# Patient Record
Sex: FEMALE | Race: WHITE | NOT HISPANIC OR LATINO | Employment: OTHER | URBAN - METROPOLITAN AREA
[De-identification: names, ages, dates, MRNs, and addresses within clinical notes are randomized per-mention and may not be internally consistent; named-entity substitution may affect disease eponyms.]

---

## 2017-05-18 ENCOUNTER — APPOINTMENT (OUTPATIENT)
Dept: WOMENS IMAGING | Facility: HOSPITAL | Age: 67
End: 2017-05-18

## 2017-05-18 PROCEDURE — G0206 DX MAMMO INCL CAD UNI: HCPCS | Performed by: RADIOLOGY

## 2017-05-18 PROCEDURE — MDREVIEWSP: Performed by: RADIOLOGY

## 2017-05-18 PROCEDURE — 76641 ULTRASOUND BREAST COMPLETE: CPT | Performed by: RADIOLOGY

## 2017-05-18 PROCEDURE — G0279 TOMOSYNTHESIS, MAMMO: HCPCS | Performed by: RADIOLOGY

## 2017-09-25 ENCOUNTER — OFFICE VISIT (OUTPATIENT)
Dept: OBSTETRICS AND GYNECOLOGY | Facility: CLINIC | Age: 67
End: 2017-09-25

## 2017-09-25 VITALS
DIASTOLIC BLOOD PRESSURE: 85 MMHG | HEIGHT: 64 IN | SYSTOLIC BLOOD PRESSURE: 170 MMHG | HEART RATE: 73 BPM | BODY MASS INDEX: 26.98 KG/M2 | WEIGHT: 158 LBS

## 2017-09-25 DIAGNOSIS — Z78.0 MENOPAUSE: Primary | ICD-10-CM

## 2017-09-25 DIAGNOSIS — Z01.419 ENCOUNTER FOR GYNECOLOGICAL EXAMINATION WITHOUT ABNORMAL FINDING: ICD-10-CM

## 2017-09-25 DIAGNOSIS — N95.2 ATROPHIC VAGINITIS: ICD-10-CM

## 2017-09-25 DIAGNOSIS — Z12.11 COLON CANCER SCREENING: ICD-10-CM

## 2017-09-25 LAB — HEMOCCULT STL QL IA: NEGATIVE

## 2017-09-25 PROCEDURE — G0328 FECAL BLOOD SCRN IMMUNOASSAY: HCPCS | Performed by: OBSTETRICS & GYNECOLOGY

## 2017-09-25 PROCEDURE — G0101 CA SCREEN;PELVIC/BREAST EXAM: HCPCS | Performed by: OBSTETRICS & GYNECOLOGY

## 2017-09-25 NOTE — PROGRESS NOTES
Subjective   Rachel Loja is a 66 y.o. female  1, Para 0 AB 1, Living 0.  Last annual 1 year, last pap 1 year, last mammogram sched, last colonoscopy 2y.  Cc: Annual exam  History of Present Illness  She denies any gynecologic problems at this time  The following portions of the patient's history were reviewed and updated as appropriate: allergies, current medications, past family history, past medical history, past social history, past surgical history and problem list.    Review of Systems   Constitutional: Negative for activity change, fatigue, fever and unexpected weight change.   HENT: Negative for hearing loss, sore throat and voice change.    Respiratory: Negative for cough, chest tightness, shortness of breath and wheezing.    Cardiovascular: Negative for chest pain, palpitations and leg swelling.   Gastrointestinal: Negative for abdominal distention, abdominal pain, anal bleeding, blood in stool, constipation, diarrhea, nausea, rectal pain and vomiting.   Endocrine: Negative for cold intolerance and heat intolerance.   Genitourinary: Negative for difficulty urinating, dyspareunia, dysuria, flank pain, frequency, genital sores, menstrual problem, pelvic pain, urgency, vaginal bleeding, vaginal discharge and vaginal pain.   Musculoskeletal: Negative for arthralgias and back pain.   Skin: Negative for rash.   Allergic/Immunologic: Negative for environmental allergies and food allergies.   Neurological: Negative for dizziness, tremors, syncope, weakness, light-headedness, numbness and headaches.   Hematological: Negative for adenopathy. Does not bruise/bleed easily.   Psychiatric/Behavioral: Negative for agitation, behavioral problems, self-injury, sleep disturbance and suicidal ideas. The patient is not nervous/anxious and is not hyperactive.          Past Medical History:   Diagnosis Date   • Environmental allergies    • Meniere's disease      Menstrual History:  OB History      Para Term  " AB Living    1    1     SAB TAB Ectopic Multiple Live Births    1             Menarche age:11  No LMP recorded (lmp unknown). Patient is postmenopausal.       History reviewed. No pertinent surgical history.  OB History      Para Term  AB Living    1    1     SAB TAB Ectopic Multiple Live Births    1            Family History   Problem Relation Age of Onset   • Ovarian cancer Cousin 52   • Prostate cancer Father      History   Smoking Status   • Never Smoker   Smokeless Tobacco   • Never Used     History   Alcohol Use   • 0.6 oz/week   • 1 Glasses of wine per week     Comment: rare     Health Maintenance   Topic Date Due   • TDAP/TD VACCINES (1 - Tdap) 10/30/1969   • PNEUMOCOCCAL VACCINES (65+ LOW/MEDIUM RISK) (1 of 2 - PCV13) 10/30/2015   • HEPATITIS C SCREENING  2016   • MEDICARE ANNUAL WELLNESS  2016   • ZOSTER VACCINE  2016   • INFLUENZA VACCINE  2017   • DXA SCAN  2017   • MAMMOGRAM  2018   • COLONOSCOPY  2025       Current Outpatient Prescriptions:   •  aspirin 81 MG chewable tablet, Chew 81 mg daily., Disp: , Rfl:   •  azelastine (ASTELIN) 0.1 % nasal spray, 2 sprays into each nostril 2 (two) times a day. Use in each nostril as directed, Disp: , Rfl:   •  calcium citrate-vitamin d (CALCIUM CITRATE + D) 315-250 MG-UNIT tablet tablet, Take  by mouth daily., Disp: , Rfl:   •  cetirizine (ZyrTEC) 10 MG tablet, Take 10 mg by mouth daily., Disp: , Rfl:   •  coenzyme Q10 100 MG capsule, Take 100 mg by mouth daily., Disp: , Rfl:   •  fluocinolone acetonide (DERMOTIC) 0.01 % oil otic oil, , Disp: , Rfl:   •  lovastatin (MEVACOR) 20 MG tablet, , Disp: , Rfl:   Sexual History: Not active  STD: Negative  Objective   Vitals:    17 1325   BP: 170/85   Pulse: 73   Weight: 158 lb (71.7 kg)   Height: 64\" (162.6 cm)     Physical Exam   Constitutional: She is oriented to person, place, and time. She appears well-developed and well-nourished.   HENT:   Head: " Normocephalic.   Eyes: Pupils are equal, round, and reactive to light.   Neck: Normal range of motion. No thyromegaly present.   Cardiovascular: Normal rate, regular rhythm, normal heart sounds and intact distal pulses.    Pulmonary/Chest: Effort normal and breath sounds normal. No respiratory distress. She exhibits no tenderness. Right breast exhibits no inverted nipple, no mass, no nipple discharge, no skin change and no tenderness. Left breast exhibits no inverted nipple, no mass, no nipple discharge, no skin change and no tenderness. Breasts are symmetrical.   Abdominal: Soft. Bowel sounds are normal. Hernia confirmed negative in the right inguinal area and confirmed negative in the left inguinal area.   Genitourinary: Rectum normal, vagina normal and uterus normal. Rectal exam shows no external hemorrhoid, no internal hemorrhoid, no fissure, no mass, no tenderness, anal tone normal and guaiac negative stool. No breast tenderness or discharge. Pelvic exam was performed with patient supine. There is no rash, tenderness, lesion or injury on the right labia. There is no rash, tenderness, lesion or injury on the left labia. Uterus is not enlarged and not tender. Cervix exhibits no motion tenderness, no discharge and no friability. Right adnexum displays no mass, no tenderness and no fullness. Left adnexum displays no mass, no tenderness and no fullness.   Genitourinary Comments: Vaginal atrophy   Lymphadenopathy:     She has no cervical adenopathy.        Right: No inguinal adenopathy present.        Left: No inguinal adenopathy present.   Neurological: She is alert and oriented to person, place, and time. She has normal reflexes.   Skin: Skin is warm and dry.   Psychiatric: She has a normal mood and affect. Her behavior is normal. Judgment and thought content normal.   Vitals reviewed.        Assessment/Plan   Rachel was seen today for gynecologic exam.    Diagnoses and all orders for this  visit:    Menopause    Encounter for gynecological examination without abnormal finding    Atrophic vaginitis    Patient was counseled about breast self-examination, mammograms, frequency of Pap smears, frequency of annual exams

## 2017-11-16 ENCOUNTER — APPOINTMENT (OUTPATIENT)
Dept: WOMENS IMAGING | Facility: HOSPITAL | Age: 67
End: 2017-11-16

## 2017-11-16 ENCOUNTER — PROCEDURE VISIT (OUTPATIENT)
Dept: OBSTETRICS AND GYNECOLOGY | Facility: CLINIC | Age: 67
End: 2017-11-16

## 2017-11-16 DIAGNOSIS — Z12.31 VISIT FOR SCREENING MAMMOGRAM: Primary | ICD-10-CM

## 2017-11-16 PROCEDURE — G0202 SCR MAMMO BI INCL CAD: HCPCS | Performed by: OBSTETRICS & GYNECOLOGY

## 2017-11-16 PROCEDURE — G0202 SCR MAMMO BI INCL CAD: HCPCS | Performed by: RADIOLOGY

## 2017-11-28 ENCOUNTER — TELEPHONE (OUTPATIENT)
Dept: OBSTETRICS AND GYNECOLOGY | Facility: CLINIC | Age: 67
End: 2017-11-28

## 2017-11-28 NOTE — TELEPHONE ENCOUNTER
----- Message from Douglas Cali MD sent at 11/28/2017 11:05 AM EST -----  Tell the patient her mammogram was negative  ----- Message -----     From: Interface, Scans Incoming     Sent: 11/28/2017   7:32 AM       To: Douglas Cali MD

## 2018-11-27 ENCOUNTER — APPOINTMENT (OUTPATIENT)
Dept: WOMENS IMAGING | Facility: HOSPITAL | Age: 68
End: 2018-11-27

## 2018-11-27 ENCOUNTER — PROCEDURE VISIT (OUTPATIENT)
Dept: OBSTETRICS AND GYNECOLOGY | Facility: CLINIC | Age: 68
End: 2018-11-27

## 2018-11-27 ENCOUNTER — OFFICE VISIT (OUTPATIENT)
Dept: OBSTETRICS AND GYNECOLOGY | Facility: CLINIC | Age: 68
End: 2018-11-27

## 2018-11-27 VITALS
DIASTOLIC BLOOD PRESSURE: 76 MMHG | BODY MASS INDEX: 26.63 KG/M2 | SYSTOLIC BLOOD PRESSURE: 163 MMHG | HEIGHT: 64 IN | WEIGHT: 156 LBS | HEART RATE: 64 BPM

## 2018-11-27 DIAGNOSIS — Z00.00 ENCOUNTER FOR SCREENING AND PREVENTATIVE CARE: Primary | ICD-10-CM

## 2018-11-27 DIAGNOSIS — Z13.820 SCREENING FOR OSTEOPOROSIS: ICD-10-CM

## 2018-11-27 DIAGNOSIS — Z78.0 MENOPAUSE: ICD-10-CM

## 2018-11-27 DIAGNOSIS — Z01.419 ENCOUNTER FOR GYNECOLOGICAL EXAMINATION WITHOUT ABNORMAL FINDING: Primary | ICD-10-CM

## 2018-11-27 DIAGNOSIS — Z12.31 VISIT FOR SCREENING MAMMOGRAM: Primary | ICD-10-CM

## 2018-11-27 DIAGNOSIS — N95.2 ATROPHIC VAGINITIS: ICD-10-CM

## 2018-11-27 PROCEDURE — 77067 SCR MAMMO BI INCL CAD: CPT | Performed by: OBSTETRICS & GYNECOLOGY

## 2018-11-27 PROCEDURE — 77080 DXA BONE DENSITY AXIAL: CPT | Performed by: OBSTETRICS & GYNECOLOGY

## 2018-11-27 PROCEDURE — 77067 SCR MAMMO BI INCL CAD: CPT | Performed by: RADIOLOGY

## 2018-11-27 PROCEDURE — G0101 CA SCREEN;PELVIC/BREAST EXAM: HCPCS | Performed by: OBSTETRICS & GYNECOLOGY

## 2018-11-27 RX ORDER — NEBIVOLOL HYDROCHLORIDE 10 MG/1
TABLET ORAL
COMMUNITY
Start: 2018-11-21 | End: 2019-12-13

## 2018-11-27 RX ORDER — TRIAMCINOLONE ACETONIDE 1 MG/G
CREAM TOPICAL
COMMUNITY
Start: 2018-10-01 | End: 2019-12-13

## 2018-11-27 RX ORDER — LISINOPRIL 20 MG/1
TABLET ORAL
COMMUNITY
Start: 2018-11-09 | End: 2019-12-13

## 2018-11-27 NOTE — PROGRESS NOTES
Subjective   Rachel Loja is a 68 y.o. female  1, Para 0 AB 1, Living 0.  Last annual 1y annually, last pap 2y, last mammogram today, last colonoscopy sched..  Cc: Annual exam  History of Present Illness  She denies any gynecologic problems at this time.  The following portions of the patient's history were reviewed and updated as appropriate: allergies, current medications, past family history, past medical history, past social history, past surgical history and problem list.    Review of Systems   Constitutional: Negative for activity change, fatigue, fever and unexpected weight change.   HENT: Negative for hearing loss, sore throat and voice change.    Respiratory: Negative for cough, chest tightness, shortness of breath and wheezing.    Cardiovascular: Negative for chest pain, palpitations and leg swelling.   Gastrointestinal: Negative for abdominal distention, abdominal pain, anal bleeding, blood in stool, constipation, diarrhea, nausea, rectal pain and vomiting.   Endocrine: Negative for cold intolerance and heat intolerance.   Genitourinary: Negative for difficulty urinating, dyspareunia, dysuria, flank pain, frequency, genital sores, menstrual problem, pelvic pain, urgency, vaginal bleeding, vaginal discharge and vaginal pain.   Musculoskeletal: Negative for arthralgias and back pain.   Skin: Negative for rash.   Allergic/Immunologic: Negative for environmental allergies and food allergies.   Neurological: Negative for dizziness, tremors, syncope, weakness, light-headedness, numbness and headaches.   Hematological: Negative for adenopathy. Does not bruise/bleed easily.   Psychiatric/Behavioral: Negative for agitation, behavioral problems, self-injury, sleep disturbance and suicidal ideas. The patient is not nervous/anxious and is not hyperactive.          Past Medical History:   Diagnosis Date   • Environmental allergies    • Hypertension    • Meniere's disease      Menstrual History:  OB History       Para Term  AB Living    1       1      SAB TAB Ectopic Molar Multiple Live Births    1                   Menarche age: 10  No LMP recorded (lmp unknown). Patient is postmenopausal.       History reviewed. No pertinent surgical history.  OB History      Para Term  AB Living    1       1      SAB TAB Ectopic Molar Multiple Live Births    1                  Family History   Problem Relation Age of Onset   • Ovarian cancer Cousin 52   • Prostate cancer Father 74   • Breast cancer Paternal Aunt 76     Social History     Tobacco Use   Smoking Status Never Smoker   Smokeless Tobacco Never Used     Social History     Substance and Sexual Activity   Alcohol Use Yes   • Alcohol/week: 0.6 oz   • Types: 1 Glasses of wine per week   • Frequency: Monthly or less    Comment: rare     Health Maintenance   Topic Date Due   • TDAP/TD VACCINES (1 - Tdap) 10/30/1969   • ZOSTER VACCINE (1 of 2) 10/30/2000   • PNEUMOCOCCAL VACCINES (65+ LOW/MEDIUM RISK) (1 of 2 - PCV13) 10/30/2015   • HEPATITIS C SCREENING  2016   • MEDICARE ANNUAL WELLNESS  2016   • DXA SCAN  2017   • INFLUENZA VACCINE  2018   • MAMMOGRAM  2019   • COLONOSCOPY  2025       Current Outpatient Medications:   •  aspirin 81 MG chewable tablet, Chew 81 mg daily., Disp: , Rfl:   •  azelastine (ASTELIN) 0.1 % nasal spray, 2 sprays into each nostril 2 (two) times a day. Use in each nostril as directed, Disp: , Rfl:   •  BYSTOLIC 10 MG tablet, , Disp: , Rfl:   •  cetirizine (ZyrTEC) 10 MG tablet, Take 10 mg by mouth daily., Disp: , Rfl:   •  fluocinolone acetonide (DERMOTIC) 0.01 % oil otic oil, , Disp: , Rfl:   •  lisinopril (PRINIVIL,ZESTRIL) 20 MG tablet, , Disp: , Rfl:   •  lovastatin (MEVACOR) 20 MG tablet, , Disp: , Rfl:   •  triamcinolone (KENALOG) 0.1 % cream, , Disp: , Rfl:   Sexual History: Not active  STD: Negative     Objective   Vitals:    18 1405   BP: 163/76   Pulse: 64   Weight: 70.8 kg (156  "lb)   Height: 162.6 cm (64\")     Physical Exam   Constitutional: She is oriented to person, place, and time. She appears well-developed and well-nourished.   HENT:   Head: Normocephalic.   Eyes: Pupils are equal, round, and reactive to light.   Neck: Normal range of motion. No thyromegaly present.   Cardiovascular: Normal rate, regular rhythm and intact distal pulses.   Pulmonary/Chest: Effort normal and breath sounds normal. No respiratory distress. She exhibits no tenderness. Right breast exhibits no inverted nipple, no mass, no nipple discharge, no skin change and no tenderness. Left breast exhibits no inverted nipple, no mass, no nipple discharge, no skin change and no tenderness. Breasts are symmetrical.   Abdominal: Soft. Bowel sounds are normal. Hernia confirmed negative in the right inguinal area and confirmed negative in the left inguinal area.   Genitourinary: Vagina normal and uterus normal. No breast tenderness or discharge. Pelvic exam was performed with patient supine. There is no rash, tenderness, lesion or injury on the right labia. There is no rash, tenderness, lesion or injury on the left labia. Uterus is not enlarged and not tender. Cervix exhibits no motion tenderness, no discharge and no friability. Right adnexum displays no mass, no tenderness and no fullness. Left adnexum displays no mass, no tenderness and no fullness.   Genitourinary Comments: Rectal exam deferred due to upcoming colonoscopy.  Moderate atrophic vaginitis   Lymphadenopathy:     She has no cervical adenopathy.        Right: No inguinal adenopathy present.        Left: No inguinal adenopathy present.   Neurological: She is alert and oriented to person, place, and time. She has normal reflexes.   Skin: Skin is warm and dry.   Psychiatric: She has a normal mood and affect. Her behavior is normal. Judgment and thought content normal.         Assessment/Plan   Rachel was seen today for annual exam.    Diagnoses and all orders for " this visit:    Encounter for gynecological examination without abnormal finding    Menopause    Atrophic vaginitis    Screening for osteoporosis    Other orders  -     HM DEXA SCAN    She was counseled about breast self-examination, mammograms, colonoscopy, osteoporotic changes

## 2018-12-03 DIAGNOSIS — R92.8 ABNORMAL MAMMOGRAM OF RIGHT BREAST: Primary | ICD-10-CM

## 2018-12-04 ENCOUNTER — TELEPHONE (OUTPATIENT)
Dept: OBSTETRICS AND GYNECOLOGY | Facility: CLINIC | Age: 68
End: 2018-12-04

## 2018-12-04 NOTE — TELEPHONE ENCOUNTER
Dona pt aware of need for additional imaging on right breast. Please schedule at [facility]. Order in chart per Dr.Dorf. Durand

## 2018-12-11 ENCOUNTER — APPOINTMENT (OUTPATIENT)
Dept: WOMENS IMAGING | Facility: HOSPITAL | Age: 68
End: 2018-12-11

## 2018-12-11 PROCEDURE — 76641 ULTRASOUND BREAST COMPLETE: CPT | Performed by: RADIOLOGY

## 2018-12-11 PROCEDURE — G0279 TOMOSYNTHESIS, MAMMO: HCPCS | Performed by: RADIOLOGY

## 2018-12-11 PROCEDURE — 77065 DX MAMMO INCL CAD UNI: CPT | Performed by: RADIOLOGY

## 2018-12-11 PROCEDURE — MDREVIEWSP: Performed by: RADIOLOGY

## 2018-12-12 VITALS
DIASTOLIC BLOOD PRESSURE: 65 MMHG | DIASTOLIC BLOOD PRESSURE: 67 MMHG | SYSTOLIC BLOOD PRESSURE: 153 MMHG | SYSTOLIC BLOOD PRESSURE: 124 MMHG | SYSTOLIC BLOOD PRESSURE: 113 MMHG | HEART RATE: 49 BPM | RESPIRATION RATE: 15 BRPM | RESPIRATION RATE: 20 BRPM | SYSTOLIC BLOOD PRESSURE: 143 MMHG | OXYGEN SATURATION: 99 % | OXYGEN SATURATION: 95 % | HEART RATE: 55 BPM | HEART RATE: 56 BPM | RESPIRATION RATE: 17 BRPM | DIASTOLIC BLOOD PRESSURE: 73 MMHG | DIASTOLIC BLOOD PRESSURE: 68 MMHG | RESPIRATION RATE: 14 BRPM | RESPIRATION RATE: 16 BRPM | HEART RATE: 63 BPM | HEART RATE: 58 BPM | OXYGEN SATURATION: 100 % | SYSTOLIC BLOOD PRESSURE: 134 MMHG | OXYGEN SATURATION: 97 % | DIASTOLIC BLOOD PRESSURE: 76 MMHG | HEART RATE: 57 BPM | RESPIRATION RATE: 21 BRPM | WEIGHT: 149 LBS | SYSTOLIC BLOOD PRESSURE: 176 MMHG | HEIGHT: 64 IN | DIASTOLIC BLOOD PRESSURE: 70 MMHG | TEMPERATURE: 97.6 F | RESPIRATION RATE: 19 BRPM | SYSTOLIC BLOOD PRESSURE: 137 MMHG | OXYGEN SATURATION: 98 % | SYSTOLIC BLOOD PRESSURE: 140 MMHG | DIASTOLIC BLOOD PRESSURE: 58 MMHG | HEART RATE: 54 BPM

## 2018-12-13 ENCOUNTER — AMBULATORY SURGICAL CENTER (AMBULATORY)
Dept: URBAN - METROPOLITAN AREA SURGERY 17 | Facility: SURGERY | Age: 68
End: 2018-12-13

## 2018-12-13 DIAGNOSIS — K57.30 DIVERTICULOSIS OF LARGE INTESTINE WITHOUT PERFORATION OR ABS: ICD-10-CM

## 2018-12-13 DIAGNOSIS — Z86.010 PERSONAL HISTORY OF COLONIC POLYPS: ICD-10-CM

## 2018-12-13 DIAGNOSIS — K64.8 OTHER HEMORRHOIDS: ICD-10-CM

## 2018-12-13 PROCEDURE — G0105 COLORECTAL SCRN; HI RISK IND: HCPCS

## 2019-12-13 ENCOUNTER — PROCEDURE VISIT (OUTPATIENT)
Dept: OBSTETRICS AND GYNECOLOGY | Facility: CLINIC | Age: 69
End: 2019-12-13

## 2019-12-13 ENCOUNTER — OFFICE VISIT (OUTPATIENT)
Dept: OBSTETRICS AND GYNECOLOGY | Facility: CLINIC | Age: 69
End: 2019-12-13

## 2019-12-13 ENCOUNTER — APPOINTMENT (OUTPATIENT)
Dept: WOMENS IMAGING | Facility: HOSPITAL | Age: 69
End: 2019-12-13

## 2019-12-13 VITALS
HEART RATE: 51 BPM | SYSTOLIC BLOOD PRESSURE: 139 MMHG | DIASTOLIC BLOOD PRESSURE: 71 MMHG | BODY MASS INDEX: 26.12 KG/M2 | WEIGHT: 153 LBS | HEIGHT: 64 IN

## 2019-12-13 DIAGNOSIS — Z01.419 ENCOUNTER FOR GYNECOLOGICAL EXAMINATION WITHOUT ABNORMAL FINDING: Primary | ICD-10-CM

## 2019-12-13 DIAGNOSIS — Z12.31 VISIT FOR SCREENING MAMMOGRAM: Primary | ICD-10-CM

## 2019-12-13 PROCEDURE — 77067 SCR MAMMO BI INCL CAD: CPT | Performed by: RADIOLOGY

## 2019-12-13 PROCEDURE — G0101 CA SCREEN;PELVIC/BREAST EXAM: HCPCS | Performed by: OBSTETRICS & GYNECOLOGY

## 2019-12-13 PROCEDURE — 77067 SCR MAMMO BI INCL CAD: CPT | Performed by: OBSTETRICS & GYNECOLOGY

## 2019-12-13 PROCEDURE — 77063 BREAST TOMOSYNTHESIS BI: CPT | Performed by: OBSTETRICS & GYNECOLOGY

## 2019-12-13 PROCEDURE — 77063 BREAST TOMOSYNTHESIS BI: CPT | Performed by: RADIOLOGY

## 2019-12-13 RX ORDER — LISINOPRIL 20 MG/1
TABLET ORAL
COMMUNITY
End: 2021-12-15

## 2019-12-13 RX ORDER — FLUTICASONE PROPIONATE 50 MCG
SPRAY, SUSPENSION (ML) NASAL
COMMUNITY
Start: 2019-12-12 | End: 2021-12-15

## 2019-12-13 RX ORDER — ATORVASTATIN CALCIUM 40 MG/1
TABLET, FILM COATED ORAL
COMMUNITY
End: 2021-12-15

## 2019-12-13 RX ORDER — NEBIVOLOL 10 MG/1
TABLET ORAL
COMMUNITY
End: 2021-12-15

## 2019-12-13 RX ORDER — CETIRIZINE HYDROCHLORIDE 10 MG/1
TABLET ORAL
COMMUNITY
End: 2021-12-15

## 2019-12-13 NOTE — PROGRESS NOTES
GYN Annual Exam     CC- Here for annual exam.     Rachel Loja is a 69 y.o. female who presents for annual well woman exam. Periods are absent due to Menopause.      OB History        1    Para        Term                AB   1    Living           SAB   1    TAB        Ectopic        Molar        Multiple        Live Births                    Current contraception: post menopausal status  History of abnormal Pap smear: yes - no treatment required  Family history of uterine, colon or ovarian cancer: yes - cousin at 52 years with ovarian cancer   History of abnormal mammogram: no  Family history of breast cancer: yes - maternal aunt   Last Pap : 2016 NL HPV neg  Last mammogram: today  Last colonoscopy: 2018  Last DEXA: 2018 NL   Paternal Hip Fracture: none    Past Medical History:   Diagnosis Date   • Environmental allergies    • Hyperlipidemia    • Hypertension    • Meniere's disease        Past Surgical History:   Procedure Laterality Date   • DILATATION AND CURETTAGE           Current Outpatient Medications:   •  aspirin 81 MG chewable tablet, Chew 81 mg daily., Disp: , Rfl:   •  atorvastatin (LIPITOR) 40 MG tablet, atorvastatin 40 mg tablet  Take 1 tablet every day by oral route at bedtime., Disp: , Rfl:   •  azelastine (ASTELIN) 0.1 % nasal spray, 2 sprays into each nostril 2 (two) times a day. Use in each nostril as directed, Disp: , Rfl:   •  cetirizine (ZYRTEC ALLERGY) 10 MG tablet, Zyrtec 10 mg tablet  Take 1 tablet every day by oral route., Disp: , Rfl:   •  fluocinolone acetonide (DERMOTIC) 0.01 % oil otic oil, , Disp: , Rfl:   •  fluticasone (FLONASE) 50 MCG/ACT nasal spray, , Disp: , Rfl:   •  lisinopril (PRINIVIL,ZESTRIL) 20 MG tablet, lisinopril 20 mg tablet  TAKE 1 TABLET BY MOUTH  EVERY DAY, Disp: , Rfl:   •  nebivolol (BYSTOLIC) 10 MG tablet, Bystolic 10 mg tablet  TAKE 1 TABLET BY MOUTH  EVERY DAY, Disp: , Rfl:     No Known Allergies    Social History     Tobacco Use  "  • Smoking status: Never Smoker   • Smokeless tobacco: Never Used   Substance Use Topics   • Alcohol use: Yes     Alcohol/week: 1.0 standard drinks     Types: 1 Glasses of wine per week     Frequency: Monthly or less     Comment: rare   • Drug use: No       Family History   Problem Relation Age of Onset   • Ovarian cancer Cousin 52   • Prostate cancer Father 74   • Breast cancer Paternal Aunt 76   • Uterine cancer Neg Hx    • Colon cancer Neg Hx    • Pulmonary embolism Neg Hx    • Deep vein thrombosis Neg Hx        Review of Systems   Constitutional: Negative for chills and fever.   Gastrointestinal: Negative for abdominal pain.   Genitourinary: Negative for dysuria, pelvic pain, vaginal bleeding and vaginal discharge.   All other systems reviewed and are negative.      /71   Pulse 51   Ht 162.6 cm (64\")   Wt 69.4 kg (153 lb)   LMP  (LMP Unknown)   Breastfeeding No   BMI 26.26 kg/m²     Physical Exam   Constitutional: She is oriented to person, place, and time. She appears well-developed and well-nourished. No distress. She is not obese.  HENT:   Head: Normocephalic and atraumatic.   Eyes: Conjunctivae are normal. Right eye exhibits no discharge. Left eye exhibits no discharge.   Neck: Normal range of motion. Neck supple. No thyromegaly present.   Cardiovascular: Normal rate, regular rhythm and normal heart sounds.   No murmur heard.  Pulmonary/Chest: Effort normal and breath sounds normal. No respiratory distress. Right breast exhibits no inverted nipple, no mass and no nipple discharge. Left breast exhibits no inverted nipple, no mass and no nipple discharge.   Abdominal: Soft. Bowel sounds are normal. She exhibits no distension. There is no tenderness.   Genitourinary: Rectum normal and cervix normal. Rectal exam shows no mass and anal tone normal. Pelvic exam was performed with patient supine. There is no lesion or Bartholin's cyst on the right labia. There is no lesion or Bartholin's cyst on the " left labia. Uterus is anteverted. Uterus is not deviated, enlarged, fixed or exhibiting a mass. Cervix does not exhibit motion tenderness or friability. Right adnexum is non-palpable.Left adnexum is non-palpable.Vagina exhibits loss of rugae. No bleeding in the vagina. No vaginal discharge found.   Musculoskeletal: Normal range of motion. She exhibits no edema.   Lymphadenopathy:     She has no cervical adenopathy.        Right: No inguinal adenopathy present.        Left: No inguinal adenopathy present.   Neurological: She is alert and oriented to person, place, and time.   Skin: Skin is warm and dry. No rash noted.   Psychiatric: She has a normal mood and affect. Her behavior is normal. Judgment and thought content normal.          Assessment     1) GYN annual well woman exam.        Plan     1) Breast Health - Clinical breast exam & mammogram and ACS, Self breast awareness monthly - ACS suggests mammogram every other year especially after age 70  2) Pap - updated today   3) Smoking status- non-smoker   4) Colon health - screening colonoscopy up to date  5) Bone health - Weight bearing exercise, dietary calcium recommendations and vitamin D reviewed.   FRAX and recent Dexa reviewed - 9.6%, 1.2%   6) Activity recommends - Adult 150-300 min/week of multi-component physical activities that include balance training, aerobic and physical strengthening.  Disabled or ill adults should still try to fulfill these requirements, with modifications based on their conditions.   7) Follow up prn and one year (Aware medicare may only cover every other year)       Bala Wallace MD   12/13/2019  12:01 PM

## 2019-12-17 LAB
CONV .: NORMAL
CYTOLOGIST CVX/VAG CYTO: NORMAL
CYTOLOGY CVX/VAG DOC CYTO: NORMAL
CYTOLOGY CVX/VAG DOC THIN PREP: NORMAL
DX ICD CODE: NORMAL
HIV 1 & 2 AB SER-IMP: NORMAL
OTHER STN SPEC: NORMAL
STAT OF ADQ CVX/VAG CYTO-IMP: NORMAL

## 2020-07-24 VITALS — WEIGHT: 136 LBS | HEIGHT: 64 IN

## 2020-07-27 ENCOUNTER — OFFICE (AMBULATORY)
Dept: URBAN - METROPOLITAN AREA TELEHEALTH 6 | Facility: TELEHEALTH | Age: 70
End: 2020-07-27

## 2020-07-27 DIAGNOSIS — Z86.010 PERSONAL HISTORY OF COLONIC POLYPS: ICD-10-CM

## 2020-07-27 DIAGNOSIS — K64.8 OTHER HEMORRHOIDS: ICD-10-CM

## 2020-07-27 DIAGNOSIS — K57.30 DIVERTICULOSIS OF LARGE INTESTINE WITHOUT PERFORATION OR ABS: ICD-10-CM

## 2020-07-27 PROCEDURE — G2012 BRIEF CHECK IN BY MD/QHP: HCPCS | Mod: 95 | Performed by: NURSE PRACTITIONER

## 2020-07-27 NOTE — SERVICEHPINOTES
I had the pleasure of seeing Ms. Vallejo via telemedicine. As you know, she is a very pleasant 69-year-old female patient of Dr. Chapa (reassigned to Dr. Lebron) with a history of colon polyps. She was last seen in our office 12/13/2018 and a bypass capacity when she underwent surveillance colonoscopy, which was normal with exception of diverticulosis. Here today for hemorrhoidal issues.She reports that she was having issues with her internal hemorrhoids with extreme sensitivty with BM. She was subsequently diagnosed with mucous membrane pemphigoid and was treated with oral steroids (followed by dematology) and reports that her rectal issues have since improved/resolved. She reports 1 formed BM per day. She denies blood in her stool, melena, diarrhea, constipation, rectal pain/itching/parenchyma. Rectal protrusions, fecal incontinence or incontinence.DATA REVIEWED:BRCN 7/23/2012 bx TA x1BRCN 10/15/2015 as below bx TA x1, mucosal polyp ×1, and juvenile polyp ×1BRCN 12/13/2018 as below no polyps no biopsies obtained

## 2020-12-14 ENCOUNTER — PROCEDURE VISIT (OUTPATIENT)
Dept: OBSTETRICS AND GYNECOLOGY | Facility: CLINIC | Age: 70
End: 2020-12-14

## 2020-12-14 ENCOUNTER — APPOINTMENT (OUTPATIENT)
Dept: WOMENS IMAGING | Facility: HOSPITAL | Age: 70
End: 2020-12-14

## 2020-12-14 DIAGNOSIS — Z12.31 VISIT FOR SCREENING MAMMOGRAM: Primary | ICD-10-CM

## 2020-12-14 PROCEDURE — 77063 BREAST TOMOSYNTHESIS BI: CPT | Performed by: OBSTETRICS & GYNECOLOGY

## 2020-12-14 PROCEDURE — 77063 BREAST TOMOSYNTHESIS BI: CPT | Performed by: RADIOLOGY

## 2020-12-14 PROCEDURE — 77067 SCR MAMMO BI INCL CAD: CPT | Performed by: OBSTETRICS & GYNECOLOGY

## 2020-12-14 PROCEDURE — 77067 SCR MAMMO BI INCL CAD: CPT | Performed by: RADIOLOGY

## 2021-03-02 DIAGNOSIS — Z23 IMMUNIZATION DUE: ICD-10-CM

## 2021-12-08 ENCOUNTER — TELEPHONE (OUTPATIENT)
Dept: OBSTETRICS AND GYNECOLOGY | Facility: CLINIC | Age: 71
End: 2021-12-08

## 2021-12-08 DIAGNOSIS — M85.80 OSTEOPENIA AFTER MENOPAUSE: Primary | ICD-10-CM

## 2021-12-08 DIAGNOSIS — Z78.0 OSTEOPENIA AFTER MENOPAUSE: Primary | ICD-10-CM

## 2021-12-08 NOTE — TELEPHONE ENCOUNTER
Good morning,     Patient would like to schedule a DEXA scan at [facility] (preferably next Wednesday 12/15/21 after her mammogram)    Thank you!

## 2021-12-13 ENCOUNTER — TELEPHONE (OUTPATIENT)
Dept: OBSTETRICS AND GYNECOLOGY | Facility: CLINIC | Age: 71
End: 2021-12-13

## 2021-12-13 NOTE — TELEPHONE ENCOUNTER
Called patient to try and reschedule bone density scan, possible to [facility] on [address]. To get her in sooner.

## 2021-12-15 ENCOUNTER — OFFICE VISIT (OUTPATIENT)
Dept: OBSTETRICS AND GYNECOLOGY | Facility: CLINIC | Age: 71
End: 2021-12-15

## 2021-12-15 ENCOUNTER — PROCEDURE VISIT (OUTPATIENT)
Dept: OBSTETRICS AND GYNECOLOGY | Facility: CLINIC | Age: 71
End: 2021-12-15

## 2021-12-15 ENCOUNTER — APPOINTMENT (OUTPATIENT)
Dept: WOMENS IMAGING | Facility: HOSPITAL | Age: 71
End: 2021-12-15

## 2021-12-15 VITALS
DIASTOLIC BLOOD PRESSURE: 63 MMHG | HEIGHT: 64 IN | HEART RATE: 47 BPM | SYSTOLIC BLOOD PRESSURE: 131 MMHG | WEIGHT: 156 LBS | BODY MASS INDEX: 26.63 KG/M2

## 2021-12-15 DIAGNOSIS — N39.41 URGE INCONTINENCE: ICD-10-CM

## 2021-12-15 DIAGNOSIS — Z12.31 VISIT FOR SCREENING MAMMOGRAM: Primary | ICD-10-CM

## 2021-12-15 DIAGNOSIS — Z01.419 ENCOUNTER FOR GYNECOLOGICAL EXAMINATION WITHOUT ABNORMAL FINDING: Primary | ICD-10-CM

## 2021-12-15 PROCEDURE — 77067 SCR MAMMO BI INCL CAD: CPT | Performed by: OBSTETRICS & GYNECOLOGY

## 2021-12-15 PROCEDURE — 77063 BREAST TOMOSYNTHESIS BI: CPT | Performed by: RADIOLOGY

## 2021-12-15 PROCEDURE — 3015F CERV CANCER SCREEN DOCD: CPT | Performed by: OBSTETRICS & GYNECOLOGY

## 2021-12-15 PROCEDURE — G0101 CA SCREEN;PELVIC/BREAST EXAM: HCPCS | Performed by: OBSTETRICS & GYNECOLOGY

## 2021-12-15 PROCEDURE — 77067 SCR MAMMO BI INCL CAD: CPT | Performed by: RADIOLOGY

## 2021-12-15 PROCEDURE — 77063 BREAST TOMOSYNTHESIS BI: CPT | Performed by: OBSTETRICS & GYNECOLOGY

## 2021-12-15 RX ORDER — AZELASTINE HCL 205.5 UG/1
SPRAY NASAL
COMMUNITY

## 2021-12-15 RX ORDER — ATORVASTATIN CALCIUM 80 MG/1
TABLET, FILM COATED ORAL
COMMUNITY
Start: 2021-12-03

## 2021-12-15 RX ORDER — NEBIVOLOL 10 MG/1
TABLET ORAL
COMMUNITY

## 2021-12-15 RX ORDER — CETIRIZINE HYDROCHLORIDE 10 MG/1
10 TABLET ORAL DAILY
COMMUNITY

## 2021-12-15 RX ORDER — FLUTICASONE PROPIONATE 50 MCG
1 SPRAY, SUSPENSION (ML) NASAL DAILY
COMMUNITY

## 2021-12-15 RX ORDER — LISINOPRIL 20 MG/1
20 TABLET ORAL DAILY
COMMUNITY

## 2021-12-15 RX ORDER — IBUPROFEN 200 MG
1 CAPSULE ORAL DAILY
COMMUNITY

## 2021-12-15 RX ORDER — CLOPIDOGREL BISULFATE 75 MG/1
TABLET ORAL
COMMUNITY
Start: 2021-09-22

## 2021-12-15 RX ORDER — DIPHENHYDRAMINE HCL 25 MG
25 CAPSULE ORAL
COMMUNITY

## 2021-12-15 NOTE — PROGRESS NOTES
GYN Annual Exam     CC- Here for annual exam.     Rachel Loja is a 71 y.o. female who presents for annual well woman exam. Periods are absent due to Menopause.    OB History        1    Para        Term                AB   1    Living           SAB   1    IAB        Ectopic        Molar        Multiple        Live Births                    Current contraception: post menopausal status  History of abnormal Pap smear: no  Family history of uterine, colon or ovarian cancer: yes - ovarian - cancer   History of abnormal mammogram: no  Family history of breast cancer: yes - aunt   Last Pap : 2019  Last mammogram: today  Last colonoscopy:   Last DEXA: 2018 Nl  Parental Hip Fracture: No    Past Medical History:   Diagnosis Date   • CVA (cerebral vascular accident) (HCC)    • Environmental allergies    • Hyperlipidemia    • Hypertension    • Meniere's disease        Past Surgical History:   Procedure Laterality Date   • DILATATION AND CURETTAGE           Current Outpatient Medications:   •  aspirin 81 MG chewable tablet, Chew 81 mg daily., Disp: , Rfl:   •  atorvastatin (LIPITOR) 80 MG tablet, , Disp: , Rfl:   •  azelastine (ASTEPRO) 0.15 % solution nasal spray, azelastine 205.5 mcg (0.15 %) nasal spray  prn, Disp: , Rfl:   •  calcium citrate (CALCITRATE) 950 (200 Ca) MG tablet, Take 1 tablet by mouth Daily., Disp: , Rfl:   •  cetirizine (zyrTEC) 10 MG tablet, Take 10 mg by mouth Daily., Disp: , Rfl:   •  clopidogrel (PLAVIX) 75 MG tablet, , Disp: , Rfl:   •  diphenhydrAMINE (BENADRYL) 25 mg capsule, Take 25 mg by mouth., Disp: , Rfl:   •  fluticasone (FLONASE) 50 MCG/ACT nasal spray, 1 spray into the nostril(s) as directed by provider Daily., Disp: , Rfl:   •  lisinopril (PRINIVIL,ZESTRIL) 20 MG tablet, Take 20 mg by mouth Daily., Disp: , Rfl:   •  nebivolol (Bystolic) 10 MG tablet, Bystolic 10 mg tablet  TAKE 1 TABLET BY MOUTH  DAILY, Disp: , Rfl:     Allergies   Allergen Reactions   •  "Beeswax Other (See Comments)   • Lanolin Other (See Comments)     Found on allergy test   • Tea Tree Oil Other (See Comments)     Found on allergy test   • Thimerosal Other (See Comments)     Found on allergy test   • Bacitracin Other (See Comments) and Rash     Found on allergy test     • Neomycin Hives   • Neosporin + Pain Relief Max St [Qpluc-Hojrj-Bdyxorq-Pramoxine] Rash   • Polymyxin B Rash       Social History     Tobacco Use   • Smoking status: Never Smoker   • Smokeless tobacco: Never Used   Substance Use Topics   • Alcohol use: Yes     Alcohol/week: 1.0 standard drink     Types: 1 Glasses of wine per week     Comment: rare   • Drug use: No       Family History   Problem Relation Age of Onset   • Ovarian cancer Cousin 52   • Prostate cancer Father 74   • Breast cancer Paternal Aunt 76   • Uterine cancer Neg Hx    • Colon cancer Neg Hx    • Pulmonary embolism Neg Hx    • Deep vein thrombosis Neg Hx        Review of Systems   Constitutional: Negative for chills, fever and unexpected weight loss.   Gastrointestinal: Negative for abdominal pain.   Genitourinary: Negative for dysuria, pelvic pain, vaginal bleeding and vaginal discharge.   All other systems reviewed and are negative.      /63   Pulse (!) 47   Ht 162.6 cm (64\")   Wt 70.8 kg (156 lb)   LMP  (LMP Unknown)   Breastfeeding No   BMI 26.78 kg/m²     Physical Exam  Constitutional:       General: She is not in acute distress.     Appearance: She is well-developed and normal weight.   Genitourinary:      Vulva, bladder, rectum and urethral meatus normal.      Right Labia: No lesions or Bartholin's cyst.     Left Labia: No lesions or Bartholin's cyst.     No inguinal adenopathy present in the right or left side.     No vaginal discharge or bleeding.      No vaginal prolapse present.     Mild vaginal atrophy present.       Right Adnexa: not tender, not full and no mass present.     Left Adnexa: not tender, not full and no mass present.     No " cervical motion tenderness or friability.      No parametrium thickening present.     Uterus is not enlarged or tender.      No uterine mass detected.  Rectum:      No rectal mass or abnormal anal tone.   Breasts:      Right: No inverted nipple, mass or nipple discharge.      Left: No inverted nipple, mass or nipple discharge.       HENT:      Head: Normocephalic and atraumatic.   Eyes:      Conjunctiva/sclera: Conjunctivae normal.      Pupils: Pupils are equal, round, and reactive to light.   Neck:      Thyroid: No thyromegaly.   Cardiovascular:      Rate and Rhythm: Normal rate and regular rhythm.      Heart sounds: Normal heart sounds. No murmur heard.      Pulmonary:      Effort: Pulmonary effort is normal. No respiratory distress.      Breath sounds: Normal breath sounds.   Abdominal:      General: Abdomen is flat. There is no distension.      Palpations: Abdomen is soft.      Tenderness: There is no abdominal tenderness.   Musculoskeletal:         General: No deformity. Normal range of motion.      Cervical back: Normal range of motion and neck supple.   Lymphadenopathy:      Lower Body: No right inguinal adenopathy. No left inguinal adenopathy.   Neurological:      Mental Status: She is alert and oriented to person, place, and time.   Skin:     General: Skin is warm and dry.      Findings: No erythema.   Psychiatric:         Behavior: Behavior normal.   Vitals reviewed. Exam conducted with a chaperone present.             Assessment     Diagnoses and all orders for this visit:    1. Encounter for gynecological examination without abnormal finding (Primary)  -     IGP, Rfx Aptima HPV ASCU    2. Urge incontinence    1) GYN exam   Expectations reviewed   2) Urgency   Discussed treatment indication/options      Plan     1) Breast Health - Clinical breast exam & mammogram reviewed specifically [employer] recommendations for screening specific to her, and Self breast awareness monthly  2) Pap - updated  today   3) Smoking status- non-smoker   4) Colon health - screening colonoscopy up to date  5) Bone health - Weight bearing exercise, dietary calcium recommendations and vitamin D reviewed.   Scheduled for DEXA upcoming   6) Encouraged to be wary of information obtained via social media and internet based on source and search.   7) Follow up prn and one year      Bala Wallace MD   12/15/2021  11:30 EST

## 2022-04-07 ENCOUNTER — HOSPITAL ENCOUNTER (OUTPATIENT)
Dept: BONE DENSITY | Facility: HOSPITAL | Age: 72
Discharge: HOME | End: 2022-04-07
Admitting: OBSTETRICS & GYNECOLOGY

## 2022-04-07 DIAGNOSIS — Z78.0 OSTEOPENIA AFTER MENOPAUSE: ICD-10-CM

## 2022-04-07 DIAGNOSIS — M85.80 OSTEOPENIA AFTER MENOPAUSE: ICD-10-CM

## 2022-04-07 PROCEDURE — 77080 DXA BONE DENSITY AXIAL: CPT

## 2022-04-11 NOTE — PROGRESS NOTES
Aleksandra, Did DEXA recently - she shows mild osteopenia - not osteoporosis. Her risk of fracture in the next 10 years is low enough to just follow the bone density for now. I did compare to 2018 and she has lost some bone, but not an excessive amount. Right hip 0.781 to 0.664 and left hip 0.693 to 0.634 - to show you. Thanks, Dr. Wallace

## 2022-04-12 ENCOUNTER — TELEPHONE (OUTPATIENT)
Dept: OBSTETRICS AND GYNECOLOGY | Facility: CLINIC | Age: 72
End: 2022-04-12

## 2022-04-12 NOTE — TELEPHONE ENCOUNTER
----- Message from Aleksandra Mane MA sent at 4/11/2022  4:15 PM EDT -----  L/m for pt/fei  ----- Message -----  From: Bala Wallace MD  Sent: 4/11/2022   1:11 PM EDT  To: DIANE Raymundo, Did DEXA recently - she shows mild osteopenia - not osteoporosis. Her risk of fracture in the next 10 years is low enough to just follow the bone density for now. I did compare to 2018 and she has lost some bone, but not an excessive amount. Right hip 0.781 to 0.664 and left hip 0.693 to 0.634 - to show you. Thanks, Dr. Wallace

## 2022-04-12 NOTE — TELEPHONE ENCOUNTER
"[place],  Pt aware: \"Aleksandra, Did DEXA recently - she shows mild osteopenia - not osteoporosis. Her risk of fracture in the next 10 years is low enough to just follow the bone density for now. I did compare to 2018 and she has lost some bone, but not an excessive amount. Right hip 0.781 to 0.664 and left hip 0.693 to 0.634 - to show you. Thanks, Dr. Wallace\".  She had no questions.  Thank you,  Raisa"

## 2022-12-21 ENCOUNTER — APPOINTMENT (OUTPATIENT)
Dept: WOMENS IMAGING | Facility: HOSPITAL | Age: 72
End: 2022-12-21
Payer: MEDICARE

## 2022-12-21 ENCOUNTER — PROCEDURE VISIT (OUTPATIENT)
Dept: OBSTETRICS AND GYNECOLOGY | Facility: CLINIC | Age: 72
End: 2022-12-21

## 2022-12-21 DIAGNOSIS — Z12.31 VISIT FOR SCREENING MAMMOGRAM: Primary | ICD-10-CM

## 2022-12-21 PROCEDURE — 77063 BREAST TOMOSYNTHESIS BI: CPT | Performed by: OBSTETRICS & GYNECOLOGY

## 2022-12-21 PROCEDURE — 77063 BREAST TOMOSYNTHESIS BI: CPT | Performed by: RADIOLOGY

## 2022-12-21 PROCEDURE — 77067 SCR MAMMO BI INCL CAD: CPT | Performed by: OBSTETRICS & GYNECOLOGY

## 2022-12-21 PROCEDURE — 77067 SCR MAMMO BI INCL CAD: CPT | Performed by: RADIOLOGY

## 2023-03-07 VITALS
HEIGHT: 64 IN | DIASTOLIC BLOOD PRESSURE: 67 MMHG | DIASTOLIC BLOOD PRESSURE: 61 MMHG | SYSTOLIC BLOOD PRESSURE: 154 MMHG | DIASTOLIC BLOOD PRESSURE: 53 MMHG | SYSTOLIC BLOOD PRESSURE: 101 MMHG | DIASTOLIC BLOOD PRESSURE: 75 MMHG | HEART RATE: 61 BPM | RESPIRATION RATE: 17 BRPM | SYSTOLIC BLOOD PRESSURE: 158 MMHG | RESPIRATION RATE: 18 BRPM | SYSTOLIC BLOOD PRESSURE: 133 MMHG | RESPIRATION RATE: 14 BRPM | HEART RATE: 59 BPM | DIASTOLIC BLOOD PRESSURE: 65 MMHG | OXYGEN SATURATION: 98 % | SYSTOLIC BLOOD PRESSURE: 136 MMHG | RESPIRATION RATE: 15 BRPM | RESPIRATION RATE: 10 BRPM | RESPIRATION RATE: 16 BRPM | DIASTOLIC BLOOD PRESSURE: 60 MMHG | SYSTOLIC BLOOD PRESSURE: 143 MMHG | SYSTOLIC BLOOD PRESSURE: 125 MMHG | OXYGEN SATURATION: 100 % | HEART RATE: 64 BPM | DIASTOLIC BLOOD PRESSURE: 68 MMHG | WEIGHT: 165 LBS | HEART RATE: 58 BPM | SYSTOLIC BLOOD PRESSURE: 127 MMHG | TEMPERATURE: 97.4 F | DIASTOLIC BLOOD PRESSURE: 57 MMHG | OXYGEN SATURATION: 99 % | TEMPERATURE: 97.1 F | RESPIRATION RATE: 9 BRPM | HEART RATE: 62 BPM | DIASTOLIC BLOOD PRESSURE: 48 MMHG | SYSTOLIC BLOOD PRESSURE: 122 MMHG | HEART RATE: 63 BPM | DIASTOLIC BLOOD PRESSURE: 56 MMHG | OXYGEN SATURATION: 97 % | DIASTOLIC BLOOD PRESSURE: 126 MMHG | SYSTOLIC BLOOD PRESSURE: 124 MMHG | HEART RATE: 52 BPM | TEMPERATURE: 97.7 F | HEART RATE: 57 BPM

## 2023-03-13 ENCOUNTER — AMBULATORY SURGICAL CENTER (AMBULATORY)
Dept: URBAN - METROPOLITAN AREA SURGERY 17 | Facility: SURGERY | Age: 73
End: 2023-03-13

## 2023-03-13 DIAGNOSIS — K57.30 DIVERTICULOSIS OF LARGE INTESTINE WITHOUT PERFORATION OR ABS: ICD-10-CM

## 2023-03-13 DIAGNOSIS — Z86.010 PERSONAL HISTORY OF COLONIC POLYPS: ICD-10-CM

## 2023-03-13 PROCEDURE — G0105 COLORECTAL SCRN; HI RISK IND: HCPCS | Performed by: INTERNAL MEDICINE

## 2023-03-13 NOTE — SERVICEHPINOTES
LOUIS RENNER  is a  72  female   who presents today for a  Colonoscopy   for   the indications listed below. The updated Patient Profile was reviewed prior to the procedure, in conjunction with the Physical Exam, including medical conditions, surgical procedures, medications, allergies, family history and social history. See Physical Exam time stamp below for date and time of HPI completion.Pre-operatively, I reviewed the indication(s) for the procedure, the risks of the procedure [including but not limited to: unexpected bleeding possibly requiring hospitalization and/or unplanned repeat procedures, perforation possibly requiring surgical treatment, missed lesions and complications of sedation/MAC (also explained by anesthesia staff)]. I have evaluated the patient for risks associated with the planned anesthesia and the procedure to be performed and find the patient an acceptable candidate for IV sedation.Multiple opportunities were provided for any questions or concerns, and all questions were answered satisfactorily before any anesthesia was administered. We will proceed with the planned procedure.br

## 2024-01-09 ENCOUNTER — OFFICE VISIT (OUTPATIENT)
Dept: OBSTETRICS AND GYNECOLOGY | Facility: CLINIC | Age: 74
End: 2024-01-09
Payer: COMMERCIAL

## 2024-01-09 VITALS
BODY MASS INDEX: 28.51 KG/M2 | HEIGHT: 64 IN | DIASTOLIC BLOOD PRESSURE: 67 MMHG | SYSTOLIC BLOOD PRESSURE: 174 MMHG | HEART RATE: 53 BPM | WEIGHT: 167 LBS

## 2024-01-09 DIAGNOSIS — Z01.419 ENCOUNTER FOR GYNECOLOGICAL EXAMINATION WITHOUT ABNORMAL FINDING: Primary | ICD-10-CM

## 2024-01-09 RX ORDER — CLOPIDOGREL BISULFATE 75 MG/1
TABLET ORAL
COMMUNITY

## 2024-01-09 NOTE — PROGRESS NOTES
GYN Annual Exam     CC- Here for annual exam.     Rachel Loja is a 73 y.o. female who presents for annual well woman exam. Periods are  absent due to Menopause.     OB History          1    Para        Term                AB   1    Living             SAB   1    IAB        Ectopic        Molar        Multiple        Live Births                    Current contraception: post menopausal status  History of abnormal Pap smear: Yes, no treatment required   Family history of uterine, colon or ovarian cancer: yes - ovarian in cousin   History of abnormal mammogram: no  Family history of breast cancer: yes - Aunt   Last Pap : 12/15/2021 NIL HPV neg  Last mammogram: 2022  Last colonoscopy: 2022 on [address]- repeat in 7 years.   Last DEXA: 2022 Osteopenia - FRAX 12% and 2.2% at that time, repeat in next year or so (after 24)   Parental Hip Fracture: No     Past Medical History:   Diagnosis Date    CVA (cerebral vascular accident)     Environmental allergies     Hyperlipidemia     Hypertension     Meniere's disease        Past Surgical History:   Procedure Laterality Date    DILATATION AND CURETTAGE           Current Outpatient Medications:     aspirin 81 MG chewable tablet, Chew 81 mg daily., Disp: , Rfl:     atorvastatin (LIPITOR) 80 MG tablet, , Disp: , Rfl:     azelastine (ASTEPRO) 0.15 % solution nasal spray, azelastine 205.5 mcg (0.15 %) nasal spray  prn, Disp: , Rfl:     cetirizine (zyrTEC) 10 MG tablet, Take 10 mg by mouth Daily., Disp: , Rfl:     clopidogrel (PLAVIX) 75 MG tablet, clopidogrel 75 mg tablet  Take 1 tablet every day by oral route., Disp: , Rfl:     diphenhydrAMINE (BENADRYL) 25 mg capsule, Take 25 mg by mouth., Disp: , Rfl:     fluticasone (FLONASE) 50 MCG/ACT nasal spray, 1 spray into the nostril(s) as directed by provider Daily., Disp: , Rfl:     lisinopril (PRINIVIL,ZESTRIL) 20 MG tablet, Take 20 mg by mouth Daily., Disp: , Rfl:     nebivolol (Bystolic) 10 MG  "tablet, Bystolic 10 mg tablet  TAKE 1 TABLET BY MOUTH  DAILY, Disp: , Rfl:     Allergies   Allergen Reactions    Beeswax Other (See Comments)    Lanolin Other (See Comments)     Found on allergy test    Tea Tree Oil Other (See Comments)     Found on allergy test    Thimerosal (Thiomersal) Other (See Comments)     Found on allergy test    Bacitracin Other (See Comments) and Rash     Found on allergy test      Neomycin Hives    Neosporin + Pain Relief Max St [Jrgja-Yihes-Hrxuety-Pramoxine] Rash    Polymyxin B Rash       Social History     Tobacco Use    Smoking status: Never    Smokeless tobacco: Never   Substance Use Topics    Alcohol use: Yes     Alcohol/week: 1.0 standard drink of alcohol     Types: 1 Glasses of wine per week     Comment: rare    Drug use: No       Family History   Problem Relation Age of Onset    Ovarian cancer Cousin 52    Prostate cancer Father 74    Breast cancer Paternal Aunt 76    Uterine cancer Neg Hx     Colon cancer Neg Hx     Pulmonary embolism Neg Hx     Deep vein thrombosis Neg Hx        Review of Systems   Constitutional:  Negative for chills, fever and unexpected weight loss.   Gastrointestinal:  Negative for abdominal pain.   Genitourinary:  Negative for dysuria, pelvic pain, vaginal bleeding and vaginal discharge.   All other systems reviewed and are negative.      /67   Pulse 53   Ht 162.6 cm (64\")   Wt 75.8 kg (167 lb)   LMP  (LMP Unknown)   BMI 28.67 kg/m²     Physical Exam  Constitutional:       General: She is not in acute distress.     Appearance: She is well-developed. She is obese.   Genitourinary:      Vulva, bladder, rectum and urethral meatus normal.      Right Labia: No lesions or Bartholin's cyst.     Left Labia: No lesions or Bartholin's cyst.     No inguinal adenopathy present in the right or left side.     No vaginal discharge or bleeding.      No vaginal prolapse present.     Mild vaginal atrophy present.       Right Adnexa: not tender, not full and no " mass present.     Left Adnexa: not tender, not full and no mass present.     No cervical motion tenderness or friability.      No parametrium thickening present.     Uterus is not enlarged or tender.      No uterine mass detected.  Rectum:      No rectal mass or abnormal anal tone.   Breasts:     Right: No inverted nipple, mass or nipple discharge.      Left: No inverted nipple, mass or nipple discharge.   HENT:      Head: Normocephalic and atraumatic.   Eyes:      Conjunctiva/sclera: Conjunctivae normal.      Pupils: Pupils are equal, round, and reactive to light.   Neck:      Thyroid: No thyromegaly.   Cardiovascular:      Rate and Rhythm: Normal rate and regular rhythm.      Heart sounds: Normal heart sounds. No murmur heard.  Pulmonary:      Effort: Pulmonary effort is normal. No respiratory distress.      Breath sounds: Normal breath sounds.   Abdominal:      General: Abdomen is flat. There is no distension.      Palpations: Abdomen is soft.      Tenderness: There is no abdominal tenderness.   Musculoskeletal:         General: No deformity. Normal range of motion.      Cervical back: Normal range of motion and neck supple.   Lymphadenopathy:      Lower Body: No right inguinal adenopathy. No left inguinal adenopathy.   Neurological:      Mental Status: She is alert and oriented to person, place, and time.   Skin:     General: Skin is warm and dry.      Findings: No erythema.   Psychiatric:         Behavior: Behavior normal.   Vitals reviewed. Exam conducted with a chaperone present.             Assessment     Diagnoses and all orders for this visit:    1. Encounter for gynecological examination without abnormal finding (Primary)  -     IGP, Rfx Aptima HPV ASCU    Reviewed in detail   Needs to consider DEXA and mammogram timing after December (2 years)      Plan     1) Breast Health - Clinical breast exam & mammogram reviewed specifically [employer] recommendations for screening specific to her, and  Self breast awareness monthly  CBE normal, Discussed timing to update MMG per various guidelines   2) Pap - updated today   3) Smoking status- non-smoker   4) Colon health - screening colonoscopy up to date  5) Bone health - Weight bearing exercise, dietary calcium recommendations and vitamin D reviewed.   Last DEXA just under 2 years ago, osteopenia but not increased risk - timing of repeat reviewed.   6) Encouraged between 7-8 hours of good sleep per night.   7) Follow up prn and one year      Bala Wallace MD   1/9/2024  11:54 EST
